# Patient Record
Sex: MALE | Race: BLACK OR AFRICAN AMERICAN | Employment: UNEMPLOYED | RURAL
[De-identification: names, ages, dates, MRNs, and addresses within clinical notes are randomized per-mention and may not be internally consistent; named-entity substitution may affect disease eponyms.]

---

## 2018-02-19 ENCOUNTER — OFFICE VISIT (OUTPATIENT)
Dept: INTERNAL MEDICINE CLINIC | Age: 28
End: 2018-02-19

## 2018-02-19 VITALS
BODY MASS INDEX: 35.31 KG/M2 | OXYGEN SATURATION: 99 % | DIASTOLIC BLOOD PRESSURE: 90 MMHG | WEIGHT: 225 LBS | SYSTOLIC BLOOD PRESSURE: 114 MMHG | RESPIRATION RATE: 16 BRPM | HEIGHT: 67 IN | TEMPERATURE: 98 F | HEART RATE: 88 BPM

## 2018-02-19 DIAGNOSIS — F32.0 MILD SINGLE CURRENT EPISODE OF MAJOR DEPRESSIVE DISORDER (HCC): ICD-10-CM

## 2018-02-19 DIAGNOSIS — G43.009 MIGRAINE WITHOUT AURA AND WITHOUT STATUS MIGRAINOSUS, NOT INTRACTABLE: ICD-10-CM

## 2018-02-19 DIAGNOSIS — J01.00 ACUTE MAXILLARY SINUSITIS, RECURRENCE NOT SPECIFIED: Primary | ICD-10-CM

## 2018-02-19 DIAGNOSIS — R53.83 FATIGUE, UNSPECIFIED TYPE: ICD-10-CM

## 2018-02-19 RX ORDER — AMOXICILLIN 500 MG/1
500 TABLET, FILM COATED ORAL 3 TIMES DAILY
Qty: 21 TAB | Refills: 0 | Status: SHIPPED | OUTPATIENT
Start: 2018-02-19 | End: 2018-02-26

## 2018-02-19 NOTE — PROGRESS NOTES
HISTORY OF PRESENT ILLNESS  Lilibeth Morse is a 28 y.o. male. Fatigue   The history is provided by the patient. Episode onset: several weeks. The problem occurs daily. Associated symptoms include headaches. Pertinent negatives include no abdominal pain. Exacerbated by: congestion. Treatments tried: tamiflu finished and flonase. The treatment provided mild relief. Started 1/10/2018, finished  Fatigue times weeks seems to started before getting the flu. Patient is new to this clinic. Comes in to establish care. Previous care was with . Reason for the change is: Insurance assigned. Vague historian especially with discussing mood issues, depression. Mom suggested he come in to evaluate headaches which are persistent but worse last few weeks. Pain tends to be bilateral accompanied by nausea. Slight cold symptoms no fever. 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      Social History Main Topics    Smoking status: Never Smoker    Smokeless tobacco: Never Used    Alcohol use No    Drug use: No    Sexual activity: Not Currently     Other Topics Concern    Not on file     Social History Narrative    Lives with mom and nephew         Review of Systems   Constitutional: Positive for fatigue and fever. HENT: Positive for congestion and sinus pain. Gastrointestinal: Positive for nausea. Negative for abdominal pain. Musculoskeletal: Negative for falls. Neurological: Positive for headaches. Negative for focal weakness. Psychiatric/Behavioral: Negative for suicidal ideas. I dont know       Physical Exam  Visit Vitals    /90 (BP 1 Location: Left arm, BP Patient Position: Sitting)    Pulse 88    Temp 98 °F (36.7 °C) (Oral)    Resp 16    Ht 5' 7\" (1.702 m)    Wt 225 lb (102.1 kg)    SpO2 99%    BMI 35.24 kg/m2     Well developed well nourished no acute distress. Pupils equal round react to light, extraocular muscles intact. Tympanic membranes within normal limits throat unremarkable  Cranial nerves II through XII are intact. Neck unremarkable  Heart regular rate and rhythm without clicks murmurs rubs  Lungs are clear to auscultation  Abdomen soft. Extremities, motor 5 out of 5 bilaterally, reflexes 2+ equal bilaterally. ASSESSMENT and PLAN    ICD-10-CM ICD-9-CM    1. Acute maxillary sinusitis, recurrence not specified J01.00 461.0    2. Mild single current episode of major depressive disorder (HCC) F32.0 296.21    3. Migraine without aura and without status migrainosus, not intractable G43.009 346.10    4. Fatigue, unspecified type R53.83 780.79        Orders Placed This Encounter    amoxicillin 500 mg tab     If antibiotics get rid of his headaches will assume its sinusitis but if they continue we will do further workup, consider migraines as an alternative cause. No longer on antidepressants unclear to me if he is depressed, discuss further upon follow-up. If the fatigue is not better will do more workup. Follow-up Disposition:  Return in about 3 weeks (around 3/12/2018) for routine follow up.

## 2018-02-19 NOTE — MR AVS SNAPSHOT
[address] [facility] 
[phone] Patient: Lady Matthews MRN: [id number] TIW:[birth year] Visit Information Date & Time Provider Department Dept. Phone Encounter #  
 2/19/2018  2:00 PM Dayana Julien MD [place] [id number] [phone] [id number] Follow-up Instructions Return in about 3 weeks (around 3/12/2018) for routine follow up. Upcoming Health Maintenance Date Due Pneumococcal 19-64 Highest Risk (2 of 3 - PCV13) 8/20/2014 DTaP/Tdap/Td series (2 - Td) 10/28/2024 Allergies as of 2/19/2018  Review Complete On: 2/19/2018 By: Dayana Julien MD  
 No Known Allergies Current Immunizations  Reviewed on 8/20/2013 Name Date Pneumococcal Polysaccharide (PPSV-23) 8/20/2013 TD Vaccine 1/14/2011  7:06 PM  
 Tdap 10/28/2014  6:03 PM  
  
 Not reviewed this visit You Were Diagnosed With   
  
 Codes Comments Acute maxillary sinusitis, recurrence not specified    -  Primary ICD-10-CM: J01.00 ICD-9-CM: 461.0 Mild single current episode of major depressive disorder (HCC)     ICD-10-CM: F32.0 ICD-9-CM: 296.21 Migraine without aura and without status migrainosus, not intractable     ICD-10-CM: B95.864 ICD-9-CM: 346.10 Vitals BP Pulse Temp Resp Height(growth percentile) Weight(growth percentile) 114/90 (BP 1 Location: Left arm, BP Patient Position: Sitting) 88 98 °F (36.7 °C) (Oral) 16 5' 7\" (1.702 m) 225 lb (102.1 kg) SpO2 BMI Smoking Status 99% 35.24 kg/m2 Never Smoker Vitals History BMI and BSA Data Body Mass Index Body Surface Area  
 35.24 kg/m 2 2.2 m 2 Preferred Pharmacy Pharmacy Name Phone [facility] [phone] [facility] [address] [phone] Your Updated Medication List  
  
   
This list is accurate as of: 2/19/18  2:49 PM.  Always use your most recent med list.  
  
  
  
 albuterol 90 mcg/actuation inhaler Commonly known as:  PROVENTIL HFA, VENTOLIN HFA, PROAIR HFA Take 2 Puffs by inhalation every four (4) hours as needed for Wheezing. amoxicillin 500 mg Tab Take 500 mg by mouth three (3) times daily for 7 days. fluticasone 50 mcg/actuation nasal spray Commonly known as:  Delmon Pickup 2 Sprays by Both Nostrils route daily. Prescriptions Sent to Pharmacy Refills  
 amoxicillin 500 mg tab 0 Sig: Take 500 mg by mouth three (3) times daily for 7 days. Class: Normal  
 Pharmacy: [place] [id number] #: [phone] Route: Oral  
  
Follow-up Instructions Return in about 3 weeks (around 3/12/2018) for routine follow up. Introducing [facility] & HEALTH SERVICES! Dear Immanuel Knee: 
Thank you for requesting a [employer] account. Our records indicate that you already have an active [employer] account. You can access your account anytime at [email or web address] Did you know that you can access your hospital and ER discharge instructions at any time in [employer]? You can also review all of your test results from your hospital stay or ER visit. Additional Information If you have questions, please visit the Frequently Asked Questions section of the [employer] website at [email or web address] Remember, [employer] is NOT to be used for urgent needs. For medical emergencies, dial 911. Now available from your iPhone and Android! Please provide this summary of care documentation to your next provider. Your primary care clinician is listed as Stephanie Aden. If you have any questions after today's visit, please call [phone].

## 2018-02-19 NOTE — PROGRESS NOTES
Chief Complaint   Patient presents with    Fatigue     sick sinse January, fatigued, pounding headaches, eye pain, nose running and running, throat sore, ears itching     I have reviewed the patient's medical history in detail and updated the computerized patient record. Health Maintenance reviewed. 1. Have you been to the ER, urgent care clinic since your last visit? Hospitalized since your last visit?no    2. Have you seen or consulted any other health care providers outside of the [facility] since your last visit? Include any pap smears or colon screening. No    Encouraged pt to discuss pt's wishes with spouse/partner/family and bring them in the next appt to follow thru with the Advanced Directive    Fall Risk Assessment, last 12 mths 2/19/2018   Able to walk? Yes   Fall in past 12 months? No       PHQ over the last two weeks 2/19/2018   Little interest or pleasure in doing things Several days   Feeling down, depressed or hopeless Several days   Total Score PHQ 2 2       Abuse Screening Questionnaire 2/19/2018   Do you ever feel afraid of your partner? N   Are you in a relationship with someone who physically or mentally threatens you? N   Is it safe for you to go home?  Y       ADL Assessment 2/19/2018   Feeding yourself No Help Needed   Getting from bed to chair No Help Needed   Getting dressed No Help Needed   Bathing or showering No Help Needed   Walk across the room (includes cane/walker) No Help Needed   Using the telphone No Help Needed   Taking your medications No Help Needed   Preparing meals No Help Needed   Managing money (expenses/bills) No Help Needed   Moderately strenuous housework (laundry) No Help Needed   Shopping for personal items (toiletries/medicines) No Help Needed   Shopping for groceries No Help Needed   Driving Help Needed   Climbing a flight of stairs No Help Needed   Getting to places beyond walking distances Help Needed

## 2018-06-19 ENCOUNTER — OFFICE VISIT (OUTPATIENT)
Dept: INTERNAL MEDICINE CLINIC | Age: 28
End: 2018-06-19

## 2018-06-19 VITALS
HEIGHT: 67 IN | RESPIRATION RATE: 18 BRPM | DIASTOLIC BLOOD PRESSURE: 97 MMHG | BODY MASS INDEX: 44.26 KG/M2 | SYSTOLIC BLOOD PRESSURE: 144 MMHG | OXYGEN SATURATION: 99 % | WEIGHT: 282 LBS | TEMPERATURE: 97.8 F | HEART RATE: 89 BPM

## 2018-06-19 DIAGNOSIS — Z13.1 SCREENING FOR DIABETES MELLITUS: ICD-10-CM

## 2018-06-19 DIAGNOSIS — Z11.3 SCREENING FOR STD (SEXUALLY TRANSMITTED DISEASE): ICD-10-CM

## 2018-06-19 DIAGNOSIS — Z13.220 SCREENING FOR CHOLESTEROL LEVEL: ICD-10-CM

## 2018-06-19 DIAGNOSIS — I10 ESSENTIAL HYPERTENSION: Primary | ICD-10-CM

## 2018-06-19 PROBLEM — E66.01 OBESITY, MORBID (HCC): Status: ACTIVE | Noted: 2018-06-19

## 2018-06-19 RX ORDER — LOSARTAN POTASSIUM AND HYDROCHLOROTHIAZIDE 12.5; 5 MG/1; MG/1
1 TABLET ORAL DAILY
Qty: 30 TAB | Refills: 1 | Status: SHIPPED | OUTPATIENT
Start: 2018-06-19 | End: 2018-07-17 | Stop reason: SDUPTHER

## 2018-06-19 NOTE — PATIENT INSTRUCTIONS
-Purchase a blood pressure cuff that goes around your upper arm and check blood pressure at least 3 times per week. Write down your numbers for review. Check blood pressure in the morning and evening. Rest for 5 minutes with feet elevated and arm resting on a table (at mid-chest level) when checking blood pressure    Goal is blood pressure is 130/80    -Exercise 30-60 minutes most days of the week, preferably with a mix of cardiovascular and strength training. Exercise can improve blood pressure, even if you do not lose weight!    -Limit alcohol intake to less than 2-3 drinks daily     -Avoid tobacco products    -Avoid caffeine, energy drinks, stimulants    -DASH diet - includes fruits, vegetables, fiber, and less than 2000 mg sodium (salt) daily. Try to eat less than 7166-5727 calories daily.  Limit fat intake.     -Avoid non-steroidal inflammatory medications (NSAIDS) such as ibuprofen, advil, motrin, aleve, and naproxyn as these may increase blood pressure if used long-term    -Avoid OTC decongestants such as pseudopherine, phenylephrine, Afrin    Start taking blood pressure medication daily return in one month for blood pressure check

## 2018-06-19 NOTE — PROGRESS NOTES
Reviewed record in preparation for visit and have obtained necessary documentation. Identified pt with two pt identifiers(name and ). Chief Complaint   Patient presents with   [address] Maintenance Due   Topic Date Due    Pneumococcal Vaccine (2 of 3 - PCV13) 2014       Mr. Shobha Jimenez has a reminder for a \"due or due soon\" health maintenance. I have asked that he discuss this further with his primary care provider for follow-up on this health maintenance. Coordination of Care Questionnaire:  :     1) Have you been to an emergency room, urgent care clinic since your last visit? no   Hospitalized since your last visit? no             2) Have you seen or consulted any other health care providers outside of [address] [place] [address] since your last visit? no  (Include any pap smears or colon screenings in this section.)    3) In the event something were to happen to you and you were unable to speak on your behalf, do you have an Advance Directive/ Living Will in place stating your wishes? NO    Do you have an Advance Directive on file? no    4) Are you interested in receiving information on Advance Directives? NO    Patient is accompanied by self I have received verbal consent from Radha Dias to discuss any/all medical information while they are present in the room.

## 2018-06-19 NOTE — PROGRESS NOTES
Mr. Lizbet Ascencio is a new patient who is here to establish care. CC:  Establish Care       HPI:    Patient is presenting to establish care     Hx of severe acid reflux - patient had Nissen procedure about 10 years ago and worked. Patient denies abdominal pain and vomiting. Patient denies sour taste in mouth    Patient has been seeing dentist and told \" consistently mildly elevated blood pressure\"   Blood pressure today is 144/97  Denies CP and shortness of breath   Has gained weight in the past 2 years   Mother has hx of HTN       Patient is concerned he may have diabetes also, at times feels increased thirst and urinatinon  Mother has a hx of diabetes    Patient is planning to go back to gym next month.  Studying for [employer]   Not sexually active currently but would like to be tested for STDs     Review of systems:  Constitutional: negative for fever, chills, weight loss, night sweats   Eyes : negative for vision changes, eye pain and discharge  Nose and Throat: negative for tinnitus, sore throat   Cardiovascular: negative for chest pain, palpitations and shortness of breath  Respiratory: negative for shortness of breath, cough and wheezing   Gastroinstestinal: negative for abdominal pain, nausea, vomiting, diarrhea, constipation, and blood in the stool  Musculoskeletal: negative for back ache and joint ache   Genitourinary: negative for dysuria, nocturia, polyuria and hematuria   Neurologic: Negative for focal weakness, numbness or incoordination  Skin: negative for rash, pruritus  Hematologic: negative for easy bruising      Past Medical History:   Diagnosis Date    Asthma     Asthma 7/22/2013    Gastrointestinal disorder     GERD (gastroesophageal reflux disease)     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 1 Inhaler 5    fluticasone (FLONASE) 50 mcg/actuation nasal spray 2 Sprays by Both Nostrils route daily. 1 Bottle 5     No current facility-administered medications on file prior to visit. family history includes Asthma in his mother; Diabetes in his mother; Hypertension in his mother. Social History     Social History    Marital status: SINGLE     Spouse name: N/A    Number of children: N/A    Years of education: N/A     Occupational History    Not on file. Social History Main Topics    Smoking status: Never Smoker    Smokeless tobacco: Never Used    Alcohol use Yes    Drug use: No    Sexual activity: Not Currently     Other Topics Concern    Not on file     Social History Narrative    Lives with mom and nephew       Visit Vitals    BP (!) 144/97 (BP 1 Location: Right arm, BP Patient Position: Sitting)    Pulse 89    Temp 97.8 °F (36.6 °C) (Oral)    Resp 18    Ht 5' 7\" (1.702 m)    Wt 282 lb (127.9 kg)    SpO2 99%    BMI 44.17 kg/m2     General:  Well appearing male no acute distress  HEENT:   PERRL,normal conjunctiva. External ear and canals normal, TMs normal.  Hearing normal to voice. Nose without edema or discharge, normal septum. Lips, teeth, gums normal.  Oropharynx: no erythema, no exudates, no lesions, normal tongue. Neck:  Supple. Thyroid normal size, nontender, without nodules. No carotid bruit. No masses or lymphadenopathy  Respiratory: no respiratory distress,  no wheezing, no rhonchi, no rales. No chest wall tenderness. Cardiovascular:  RRR, normal S1S2, no murmur.     Gastrointestinal: normal bowel sounds, soft, nontender, without masses. No hepatosplenomegaly. Extremities +2 pulses, no edema, normal sensation   Musculoskeletal:  Normal gait. Normal digits and nails. Normal strength and tone, no atrophy, and no abnormal movement. Skin:  No rash, no lesions, no ulcers. Skin warm, normal turgor, without induration or nodules. Neuro:  A and OX4, fluent speech, cranial nerves normal 2-12. Sensation normal to light touch. DTR symmetrical  Psych:  Normal affect      Lab Results   Component Value Date/Time    WBC 4.7 01/08/2014 11:35 AM    Hemoglobin (POC) 16.7 01/08/2014 11:41 AM    HGB 15.3 01/08/2014 11:35 AM    Hematocrit (POC) 49 01/08/2014 11:41 AM    HCT 48.1 01/08/2014 11:35 AM    PLATELET 650 07/78/4659 11:35 AM    MCV 71.6 (L) 01/08/2014 11:35 AM     Lab Results   Component Value Date/Time    Sodium 140 08/20/2013 02:18 PM    Potassium 4.5 08/20/2013 02:18 PM    Chloride 101 08/20/2013 02:18 PM    CO2 26 08/20/2013 02:18 PM    Anion gap 9 02/23/2013 06:55 AM    Glucose 74 08/20/2013 02:18 PM    Glucose 95 02/24/2013 06:45 AM    BUN 11 08/20/2013 02:18 PM    Creatinine 0.94 08/20/2013 02:18 PM    BUN/Creatinine ratio 12 08/20/2013 02:18 PM    GFR est AA >60 02/23/2013 06:55 AM    GFR est non- 08/20/2013 02:18 PM    Calcium 9.5 08/20/2013 02:18 PM     Lab Results   Component Value Date/Time    Cholesterol, total 143 02/24/2013 06:45 AM    HDL Cholesterol 43 02/24/2013 06:45 AM    LDL, calculated 84.2 02/24/2013 06:45 AM    VLDL, calculated 15.8 02/24/2013 06:45 AM    Triglyceride 79 02/24/2013 06:45 AM    CHOL/HDL Ratio 3.3 02/24/2013 06:45 AM     Lab Results   Component Value Date/Time    TSH 0.87 02/24/2013 06:45 AM     No results found for: HBA1C, HGBE8, NPW4JDVJ, RIC0YOIP, YKX2VBDV  No results found for: Camronadalgisa Mueller, XQVID3, XQVID, VD3RIA                Assessment and Plan:   27 yo woman presenting to [facility] care and noted to have HTN     1.  Essential hypertension  New diagnosis, stage II HTN. Patient has family hx of HTN and is obese. recommend checking blood pressure with goal of less than  130/85 on average. Follow lo sodium diet. Given DASH diet guidelines. Recommend cardiovascular exercise regularly. Work on weight reduction. Call with blood pressure readings.   - METABOLIC PANEL, COMPREHENSIVE  - CBC WITH AUTOMATED DIFF  - URINALYSIS W/ RFLX MICROSCOPIC  - losartan-hydroCHLOROthiazide (HYZAAR) 50-12.5 mg per tablet; Take 1 Tab by mouth daily. Dispense: 30 Tab; Refill: 1    2. Screening for diabetes mellitus  - HEMOGLOBIN A1C WITH EAG    3. Screening for cholesterol level  - LIPID PANEL    4. Screening for STD (sexually transmitted disease)  - HIV 1/2 AG/AB, 4TH GENERATION,W RFLX CONFIRM  - RPR    5. GERD: patient is s/p nissen procedure in 2008 - denied active GERD symptoms    We discuss need to loose weight and exercise     Follow-up Disposition:  Return in about 4 weeks (around 7/17/2018) for blood pressure .      Luci Mckeon MD

## 2018-06-19 NOTE — MR AVS SNAPSHOT
[address] [place] [address] 
[phone] Patient: Andrew Bose MRN: [id number] TRD:0/54/6114 Visit Information Date & Time Provider Department Dept. Phone Encounter #  
 6/19/2018  9:30 AM RoxannwestonBrayden [address] [place] [address],[address] [phone] [id number] Follow-up Instructions Return in about 4 weeks (around 7/17/2018) for blood pressure . Upcoming Health Maintenance Date Due Pneumococcal 19-64 Highest Risk (2 of 3 - PCV13) 8/20/2014 Influenza Age 5 to Adult 8/1/2018 DTaP/Tdap/Td series (2 - Td) 10/28/2024 Allergies as of 6/19/2018  Review Complete On: 6/19/2018 By: Liseth Blanchard No Known Allergies Current Immunizations  Reviewed on 8/20/2013 Name Date Pneumococcal Polysaccharide (PPSV-23) 8/20/2013 TD Vaccine 1/14/2011  7:06 PM  
 Tdap 10/28/2014  6:03 PM  
  
 Not reviewed this visit You Were Diagnosed With   
  
 Codes Comments Essential hypertension    -  Primary ICD-10-CM: I10 
ICD-9-CM: 401.9 Screening for diabetes mellitus     ICD-10-CM: Z13.1 ICD-9-CM: V77.1 Screening for cholesterol level     ICD-10-CM: Z13.220 ICD-9-CM: V77.91 Screening for STD (sexually transmitted disease)     ICD-10-CM: Z11.3 ICD-9-CM: V74.5 Vitals BP Pulse Temp Resp Height(growth percentile) Weight(growth percentile) (!) 144/97 (BP 1 Location: Right arm, BP Patient Position: Sitting) 89 97.8 °F (36.6 °C) (Oral) 18 5' 7\" (1.702 m) 282 lb (127.9 kg) SpO2 BMI Smoking Status 99% 44.17 kg/m2 Never Smoker BMI and BSA Data Body Mass Index Body Surface Area  
 44.17 kg/m 2 2.46 m 2 [facility] [facility] [address] [phone] Your Updated Medication List  
  
   
This list is accurate as of 6/19/18 10:14 AM.  Always use your most recent med list.  
  
  
  
  
 albuterol 90 mcg/actuation inhaler Commonly known as:  PROVENTIL HFA, VENTOLIN HFA, PROAIR HFA Take 2 Puffs by inhalation every four (4) hours as needed for Wheezing. fluticasone 50 mcg/actuation nasal spray Commonly known as:  Lovetta End 2 Sprays by Both Nostrils route daily. losartan-hydroCHLOROthiazide 50-12.5 mg per tablet Commonly known as:  HYZAAR Take 1 Tab by mouth daily. Prescriptions Sent to Pharmacy Refills  
 losartan-hydroCHLOROthiazide (HYZAAR) 50-12.5 mg per tablet 1 Sig: Take 1 Tab by mouth daily. Class: Normal  
 Pharmacy: [facility] [id number] #: [phone] Route: Oral  
  
We Performed the Following CBC WITH AUTOMATED DIFF [43668 CPT(R)] HEMOGLOBIN A1C WITH EAG [69843 CPT(R)] HIV 1/2 AG/AB, 4TH GENERATION,W RFLX CONFIRM C1870317 CPT(R)] LIPID PANEL [06924 CPT(R)] METABOLIC PANEL, COMPREHENSIVE [52496 CPT(R)] RPR [93465 CPT(R)] URINALYSIS W/ RFLX MICROSCOPIC [55489 CPT(R)] Follow-up Instructions Return in about 4 weeks (around 7/17/2018) for blood pressure . Patient Instructions   
-Purchase a blood pressure cuff that goes around your upper arm and check blood pressure at least 3 times per week. Write down your numbers for review. Check blood pressure in the morning and evening. Rest for 5 minutes with feet elevated and arm resting on a table (at mid-chest level) when checking blood pressure Goal is blood pressure is 130/80 
 
-Exercise 30-60 minutes most days of the week, preferably with a mix of cardiovascular and strength training. Exercise can improve blood pressure, even if you do not lose weight! 
 
-Limit alcohol intake to less than 2-3 drinks daily -Avoid tobacco products -Avoid caffeine, energy drinks, stimulants -DASH diet - includes fruits, vegetables, fiber, and less than 2000 mg sodium (salt) daily. Try to eat less than 8994-7299 calories daily. Limit fat intake.  
 
-Avoid non-steroidal inflammatory medications (NSAIDS) such as ibuprofen, advil, motrin, aleve, and naproxyn as these may increase blood pressure if used long-term 
 
-Avoid OTC decongestants such as pseudopherine, phenylephrine, Afrin Start taking blood pressure medication daily return in one month for blood pressure check Introducing [facility] & HEALTH SERVICES! Dear Nuha Mi: 
Thank you for requesting a [employer] account. Our records indicate that you already have an active [employer] account. You can access your account anytime at [email or web address] Did you know that you can access your hospital and ER discharge instructions at any time in [employer] You can also review all of your test results from your hospital stay or ER visit. Additional Information If you have questions, please visit the Frequently Asked Questions section of the [employer] website at [email or web address] Remember, [employer] is NOT to be used for urgent needs. For medical emergencies, dial 911. Now available from your iPhone and Android! Please provide this summary of care documentation to your next provider. Your primary care clinician is listed as KEVIN Ma. If you have any questions after today's visit, please call [phone].

## 2018-06-20 LAB
ALBUMIN SERPL-MCNC: 4.3 G/DL (ref 3.5–5.5)
ALBUMIN/GLOB SERPL: 1.8 {RATIO} (ref 1.2–2.2)
ALP SERPL-CCNC: 79 IU/L (ref 39–117)
ALT SERPL-CCNC: 21 IU/L (ref 0–44)
APPEARANCE UR: CLEAR
AST SERPL-CCNC: 23 IU/L (ref 0–40)
BASOPHILS # BLD AUTO: 0 X10E3/UL (ref 0–0.2)
BASOPHILS NFR BLD AUTO: 0 %
BILIRUB SERPL-MCNC: 0.3 MG/DL (ref 0–1.2)
BILIRUB UR QL STRIP: NEGATIVE
BUN SERPL-MCNC: 7 MG/DL (ref 6–20)
BUN/CREAT SERPL: 6 (ref 9–20)
CALCIUM SERPL-MCNC: 9.1 MG/DL (ref 8.7–10.2)
CHLORIDE SERPL-SCNC: 102 MMOL/L (ref 96–106)
CHOLEST SERPL-MCNC: 184 MG/DL (ref 100–199)
CO2 SERPL-SCNC: 22 MMOL/L (ref 20–29)
COLOR UR: YELLOW
CREAT SERPL-MCNC: 1.2 MG/DL (ref 0.76–1.27)
EOSINOPHIL # BLD AUTO: 0.1 X10E3/UL (ref 0–0.4)
EOSINOPHIL NFR BLD AUTO: 1 %
ERYTHROCYTE [DISTWIDTH] IN BLOOD BY AUTOMATED COUNT: 15.3 % (ref 12.3–15.4)
EST. AVERAGE GLUCOSE BLD GHB EST-MCNC: 114 MG/DL
GFR SERPLBLD CREATININE-BSD FMLA CKD-EPI: 82 ML/MIN/1.73
GFR SERPLBLD CREATININE-BSD FMLA CKD-EPI: 95 ML/MIN/1.73
GLOBULIN SER CALC-MCNC: 2.4 G/DL (ref 1.5–4.5)
GLUCOSE SERPL-MCNC: 88 MG/DL (ref 65–99)
GLUCOSE UR QL: NEGATIVE
HBA1C MFR BLD: 5.6 % (ref 4.8–5.6)
HCT VFR BLD AUTO: 46.1 % (ref 37.5–51)
HDLC SERPL-MCNC: 44 MG/DL
HGB BLD-MCNC: 14.8 G/DL (ref 13–17.7)
HGB UR QL STRIP: NEGATIVE
HIV 1+2 AB+HIV1 P24 AG SERPL QL IA: NON REACTIVE
IMM GRANULOCYTES # BLD: 0 X10E3/UL (ref 0–0.1)
IMM GRANULOCYTES NFR BLD: 0 %
KETONES UR QL STRIP: NEGATIVE
LDLC SERPL CALC-MCNC: 125 MG/DL (ref 0–99)
LEUKOCYTE ESTERASE UR QL STRIP: NEGATIVE
LYMPHOCYTES # BLD AUTO: 2.6 X10E3/UL (ref 0.7–3.1)
LYMPHOCYTES NFR BLD AUTO: 41 %
MCH RBC QN AUTO: 22.5 PG (ref 26.6–33)
MCHC RBC AUTO-ENTMCNC: 32.1 G/DL (ref 31.5–35.7)
MCV RBC AUTO: 70 FL (ref 79–97)
MICRO URNS: NORMAL
MONOCYTES # BLD AUTO: 0.5 X10E3/UL (ref 0.1–0.9)
MONOCYTES NFR BLD AUTO: 7 %
NEUTROPHILS # BLD AUTO: 3.3 X10E3/UL (ref 1.4–7)
NEUTROPHILS NFR BLD AUTO: 51 %
NITRITE UR QL STRIP: NEGATIVE
PH UR STRIP: 6 [PH] (ref 5–7.5)
PLATELET # BLD AUTO: 227 X10E3/UL (ref 150–379)
POTASSIUM SERPL-SCNC: 4.1 MMOL/L (ref 3.5–5.2)
PROT SERPL-MCNC: 6.7 G/DL (ref 6–8.5)
PROT UR QL STRIP: NEGATIVE
RBC # BLD AUTO: 6.59 X10E6/UL (ref 4.14–5.8)
RPR SER QL: NON REACTIVE
SODIUM SERPL-SCNC: 142 MMOL/L (ref 134–144)
SP GR UR: 1.02 (ref 1–1.03)
TRIGL SERPL-MCNC: 77 MG/DL (ref 0–149)
UROBILINOGEN UR STRIP-MCNC: 1 MG/DL (ref 0.2–1)
VLDLC SERPL CALC-MCNC: 15 MG/DL (ref 5–40)
WBC # BLD AUTO: 6.5 X10E3/UL (ref 3.4–10.8)

## 2018-06-20 NOTE — PROGRESS NOTES
Normal kidney and liver function   No diabetes  STD testing is negative  Cholesterol: Triglycerides are normal ( short term fat storage), HDL good cholesterol is at goal,  LDL which is bad cholesterol is mildly elevated. Recommend increasing  exercise to 30 minutes daily and increased fiber intake - vegetables, fruits and oats and whole grain. Decreasing fatty food intake. We will repeat cholesterol level in one year.

## 2018-07-17 ENCOUNTER — OFFICE VISIT (OUTPATIENT)
Dept: INTERNAL MEDICINE CLINIC | Age: 28
End: 2018-07-17

## 2018-07-17 VITALS
OXYGEN SATURATION: 99 % | WEIGHT: 283 LBS | RESPIRATION RATE: 18 BRPM | DIASTOLIC BLOOD PRESSURE: 86 MMHG | HEIGHT: 67 IN | SYSTOLIC BLOOD PRESSURE: 121 MMHG | HEART RATE: 94 BPM | TEMPERATURE: 97.8 F | BODY MASS INDEX: 44.42 KG/M2

## 2018-07-17 DIAGNOSIS — I10 ESSENTIAL HYPERTENSION: ICD-10-CM

## 2018-07-17 DIAGNOSIS — I10 ESSENTIAL HYPERTENSION: Primary | ICD-10-CM

## 2018-07-17 DIAGNOSIS — R53.82 CHRONIC FATIGUE: ICD-10-CM

## 2018-07-17 RX ORDER — LOSARTAN POTASSIUM AND HYDROCHLOROTHIAZIDE 12.5; 5 MG/1; MG/1
1 TABLET ORAL DAILY
Qty: 30 TAB | Refills: 1 | Status: SHIPPED | OUTPATIENT
Start: 2018-07-17 | End: 2018-10-21 | Stop reason: SDUPTHER

## 2018-07-17 NOTE — MR AVS SNAPSHOT
[address] 
[phone] Patient: Dakota Vasquez MRN: [id number] RBD:[id number] Visit Information Date & Time Provider Department Dept. Phone Encounter #  
 7/17/2018  9:30 AM Myrt[place], [address] [place] [address],[address] [phone] [id number] Follow-up Instructions Return in about 3 months (around 10/17/2018) for HTN. Upcoming Health Maintenance Date Due Pneumococcal 19-64 Highest Risk (2 of 3 - PCV13) 8/20/2014 Influenza Age 5 to Adult 8/1/2018 DTaP/Tdap/Td series (2 - Td) 10/28/2024 Allergies as of 7/17/2018  Review Complete On: 6/19/2018 By: MD Myrtle  
 No Known Allergies Current Immunizations  Reviewed on 8/20/2013 Name Date Pneumococcal Polysaccharide (PPSV-23) 8/20/2013 TD Vaccine 1/14/2011  7:06 PM  
 Tdap 10/28/2014  6:03 PM  
  
 Not reviewed this visit You Were Diagnosed With   
  
 Codes Comments Essential hypertension    -  Primary ICD-10-CM: I10 
ICD-9-CM: 401.9 Chronic fatigue     ICD-10-CM: R53.82 
ICD-9-CM: 780.79 Vitals BP Pulse Temp Resp Height(growth percentile) Weight(growth percentile) 121/86 (BP 1 Location: Right arm, BP Patient Position: Sitting) 94 97.8 °F (36.6 °C) (Oral) 18 5' 7\" (1.702 m) 283 lb (128.4 kg) SpO2 BMI Smoking Status 99% 44.32 kg/m2 Never Smoker Vitals History BMI and BSA Data Body Mass Index Body Surface Area 44.32 kg/m 2 2.46 m 2 Preferred Pharmacy Pharmacy Name Phone [facility] [phone] [place] [address] [phone] Your Updated Medication List  
  
   
This list is accurate as of 7/17/18  9:42 AM.  Always use your most recent med list.  
  
  
  
  
 albuterol 90 mcg/actuation inhaler Commonly known as:  PROVENTIL HFA, VENTOLIN HFA, PROAIR HFA  
 Take 2 Puffs by inhalation every four (4) hours as needed for Wheezing. fluticasone 50 mcg/actuation nasal spray Commonly known as:  Gerard Cyphers 2 Sprays by Both Nostrils route daily. losartan-hydroCHLOROthiazide 50-12.5 mg per tablet Commonly known as:  HYZAAR Take 1 Tab by mouth daily. We Performed the Following METABOLIC PANEL, BASIC [93099 CPT(R)] TSH AND FREE T4 [16067 CPT(R)] Follow-up Instructions Return in about 3 months (around 10/17/2018) for HTN. Patient Instructions If headaches persist we will change blood pressure medication We are checking thyroid and kidney function today Introducing [facility] SERVICES! Dear Haja Carbajal: 
Thank you for requesting a [employer] account. Our records indicate that you already have an active [employer] account. You can access your account anytime at [email or web address]. [email or web address] Did you know that you can access your hospital and ER discharge instructions at any time in [employer]? You can also review all of your test results from your hospital stay or ER visit. Additional Information If you have questions, please visit the Frequently Asked Questions section of the [employer] website at [email or web address] Remember, [employer] is NOT to be used for urgent needs. For medical emergencies, dial 911. Now available from your iPhone and Android! Please provide this summary of care documentation to your next provider. Your primary care clinician is listed as KEVIN Ma. If you have any questions after today's visit, please call [phone].

## 2018-07-17 NOTE — PATIENT INSTRUCTIONS
If headaches persist we will change blood pressure medication  We are checking thyroid and kidney function today

## 2018-07-17 NOTE — PROGRESS NOTES
Reviewed record in preparation for visit and have obtained necessary documentation. Identified pt with two pt identifiers(name and ). Chief Complaint   Patient presents with    Hypertension       Health Maintenance Due   Topic Date Due    Pneumococcal Vaccine (2 of 3 - PCV13) 2014       Mr. Benita Stevens has a reminder for a \"due or due soon\" health maintenance. I have asked that he discuss this further with his primary care provider for follow-up on this health maintenance. Coordination of Care Questionnaire:  :     1) Have you been to an emergency room, urgent care clinic since your last visit? no   Hospitalized since your last visit? no             2) Have you seen or consulted any other health care providers outside of [address] [place] [address] since your last visit? no  (Include any pap smears or colon screenings in this section.)    3) In the event something were to happen to you and you were unable to speak on your behalf, do you have an Advance Directive/ Living Will in place stating your wishes? NO    Do you have an Advance Directive on file? no    4) Are you interested in receiving information on Advance Directives? NO    Patient is accompanied by self I have received verbal consent from Minerva Mejia to discuss any/all medical information while they are present in the room.

## 2018-07-18 LAB
BUN SERPL-MCNC: 13 MG/DL (ref 6–20)
BUN/CREAT SERPL: 11 (ref 9–20)
CALCIUM SERPL-MCNC: 9.5 MG/DL (ref 8.7–10.2)
CHLORIDE SERPL-SCNC: 96 MMOL/L (ref 96–106)
CO2 SERPL-SCNC: 25 MMOL/L (ref 20–29)
CREAT SERPL-MCNC: 1.16 MG/DL (ref 0.76–1.27)
GLUCOSE SERPL-MCNC: 89 MG/DL (ref 65–99)
POTASSIUM SERPL-SCNC: 3.7 MMOL/L (ref 3.5–5.2)
SODIUM SERPL-SCNC: 140 MMOL/L (ref 134–144)
T4 FREE SERPL-MCNC: 1.61 NG/DL (ref 0.82–1.77)
TSH SERPL DL<=0.005 MIU/L-ACNC: 3.35 UIU/ML (ref 0.45–4.5)

## 2018-10-18 ENCOUNTER — OFFICE VISIT (OUTPATIENT)
Dept: INTERNAL MEDICINE CLINIC | Age: 28
End: 2018-10-18

## 2018-10-18 VITALS
RESPIRATION RATE: 16 BRPM | TEMPERATURE: 97.7 F | HEIGHT: 67 IN | HEART RATE: 76 BPM | DIASTOLIC BLOOD PRESSURE: 82 MMHG | WEIGHT: 268 LBS | OXYGEN SATURATION: 98 % | SYSTOLIC BLOOD PRESSURE: 117 MMHG | BODY MASS INDEX: 42.06 KG/M2

## 2018-10-18 DIAGNOSIS — Z23 ENCOUNTER FOR IMMUNIZATION: ICD-10-CM

## 2018-10-18 DIAGNOSIS — R53.82 CHRONIC FATIGUE: ICD-10-CM

## 2018-10-18 DIAGNOSIS — E66.01 OBESITY, MORBID (HCC): ICD-10-CM

## 2018-10-18 DIAGNOSIS — I10 ESSENTIAL HYPERTENSION: Primary | ICD-10-CM

## 2018-10-18 DIAGNOSIS — R06.83 SNORING: ICD-10-CM

## 2018-10-18 DIAGNOSIS — R40.0 DAYTIME SLEEPINESS: ICD-10-CM

## 2018-10-18 NOTE — PROGRESS NOTES
CC: Follow-up      HPI:    He is a 33 y.o. male who presents for evaluation of     He was las seen on 07/17    HTN: patient is on losartan 50mg with HCTZ 12.5mg daily. Denies chest pain and shortness of breath. Patient snores, feels sleepy in the car. Does not feel rested after sleeping. Patient lost 12 lbs since last visit. Patient is on celexa and Seroquel,recnetly Celexa does increased - had a difficulty last month due to personal situations. Denies SI   Sees mental health NP Kasandra Brittle  Follow-up Disposition:  Return in about 3 months (around 10/17/2018) for HTN.      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  QUEtiapine (SEROQUEL) 25 mg tablet Take 25 mg by mouth nightly.  losartan-hydroCHLOROthiazide (HYZAAR) 50-12.5 mg per tablet Take 1 Tab by mouth daily. 30 Tab 1     No current facility-administered medications on file prior to visit.         Past Surgical History:   Procedure Laterality Date    ABDOMEN SURGERY PROC UNLISTED  2005    nissen    HX GI      gerd    HX ORTHOPAEDIC      bilateral feet    HX TONSIL AND ADENOIDECTOMY      HX UROLOGICAL      stone  removal       Family History   Problem Relation Age of Onset    Asthma Mother     Hypertension Mother     Diabetes Mother      Reviewed and no changes     Social History     Socioeconomic History    Marital status: SINGLE     Spouse name: Not on file    Number of children: Not on file    Years of education: Not on file    Highest education level: Not on file   Social Needs    Financial resource strain: Not on file    Food insecurity - worry: Not on file    Food insecurity - inability: Not on file    Transportation needs - medical: Not on file   Ky[place]us needs - non-medical: Not on file   Occupational History    Not on file   Tobacco Use    Smoking status: Never Smoker    Smokeless tobacco: Never Used   Substance and Sexual Activity    Alcohol use:  Yes    Drug use: No    Sexual activity: Not Currently   Other Topics Concern    Not on file   Social History Narrative    Lives with mom and nephew            Visit Vitals  /82 (BP 1 Location: Right arm, BP Patient Position: Sitting)   Pulse 76   Temp 97.7 °F (36.5 °C) (Oral)   Resp 16   Ht 5' 7\" (1.702 m)   Wt 268 lb (121.6 kg)   SpO2 98%   BMI 41.97 kg/m²       Physical Examination:   General - Well appearing male  HEENT - PERRL, TM no erythema/opacification, normal nasal turbinates, oropharynx no erythema or exudate, MMM  Neck - supple, no bruits, no TMG, no LAD  Pulm - clear to auscultation bilaterally  Cardio - RRR, normal S1 S2, no murmur gallops or rubs  Abd - soft, nontender, no masses, no HSM  Extrem - no edema, +2 distal pulses  Psych - normal affect, appropriate mood    Lab Results   Component Value Date/Time    WBC 6.4 09/10/2018 02:00 PM    Hemoglobin (POC) 16.7 01/08/2014 11:41 AM    HGB 14.3 09/10/2018 02:00 PM    Hematocrit (POC) 49 01/08/2014 11:41 AM    HCT 46.6 09/10/2018 02:00 PM    PLATELET 422 95/25/0488 02:00 PM    MCV 72.4 (L) 09/10/2018 02:00 PM     Lab Results   Component Value Date/Time    Sodium 137 09/10/2018 02:00 PM    Potassium 3.5 09/10/2018 02:00 PM    Chloride 101 09/10/2018 02:00 PM    CO2 31 09/10/2018 02:00 PM    Anion gap 5 09/10/2018 02:00 PM    Glucose 88 09/10/2018 02:00 PM    Glucose 95 02/24/2013 06:45 AM    BUN 12 09/10/2018 02:00 PM    Creatinine 1.35 (H) 09/10/2018 02:00 PM    BUN/Creatinine ratio 9 (L) 09/10/2018 02:00 PM    GFR est AA >60 09/10/2018 02:00 PM    GFR est non-AA >60 09/10/2018 02:00 PM    Calcium 9.0 09/10/2018 02:00 PM     Lab Results   Component Value Date/Time    Cholesterol, total 184 06/19/2018 10:22 AM    HDL Cholesterol 44 06/19/2018 10:22 AM    LDL, calculated 125 (H) 06/19/2018 10:22 AM    VLDL, calculated 15 06/19/2018 10:22 AM    Triglyceride 77 06/19/2018 10:22 AM    CHOL/HDL Ratio 3.3 02/24/2013 06:45 AM     Lab Results   Component Value Date/Time    TSH 3.350 07/17/2018 10:07 AM     No results found for: PSA, Nevelyn Kras, [id number], [id number], PSALT  Lab Results   Component Value Date/Time    Hemoglobin A1c 5.6 06/19/2018 10:22 AM     No results found for: [place] VD3RIA    Lab Results   Component Value Date/Time    ALT (SGPT) 39 09/10/2018 02:00 PM    AST (SGOT) 30 09/10/2018 02:00 PM    Alk. phosphatase 82 09/10/2018 02:00 PM    Bilirubin, direct <0.1 01/08/2014 11:35 AM    Bilirubin, total 0.7 09/10/2018 02:00 PM           Assessment/Plan:    1. Encounter for immunization  - INFLUENZA VIRUS VAC QUAD,SPLIT,PRESV FREE SYRINGE IM    2. Obesity, morbid ([address]  - lost 12 lbs, congratulated patient, counseled on healthy diet    3. Essential hypertension  - blood pressure is well controlled on current regimen  - continue losartan 50mg and HCTZ 12.5mg     4. Daytime sleepiness    5. Snoring    6. Chronic fatigue  Suspect MING and ordered sleep study    7.  Depression: stable on seroquel and celexa   Sees mental health NP Jose Carlos Barragan  Follow-up Disposition: Not on Elvis Dewitt MD

## 2018-10-18 NOTE — PATIENT INSTRUCTIONS
Continue same dose of blood pressure medication    We are ordering a sleep study to be done at home to evaluate for possible sleep apnea which is a common cause of fatigue snoring and not having rest full sleep.    Obesity and High blood pressure are big risk factors

## 2018-10-21 DIAGNOSIS — I10 ESSENTIAL HYPERTENSION: ICD-10-CM

## 2018-10-22 RX ORDER — LOSARTAN POTASSIUM AND HYDROCHLOROTHIAZIDE 12.5; 5 MG/1; MG/1
TABLET ORAL
Qty: 30 TAB | Refills: 1 | Status: SHIPPED | OUTPATIENT
Start: 2018-10-22 | End: 2018-12-23 | Stop reason: SDUPTHER

## 2018-12-23 DIAGNOSIS — I10 ESSENTIAL HYPERTENSION: ICD-10-CM

## 2018-12-24 RX ORDER — LOSARTAN POTASSIUM AND HYDROCHLOROTHIAZIDE 12.5; 5 MG/1; MG/1
TABLET ORAL
Qty: 30 TAB | Refills: 1 | Status: SHIPPED | OUTPATIENT
Start: 2018-12-24 | End: 2019-03-12 | Stop reason: SDUPTHER

## 2019-03-12 DIAGNOSIS — I10 ESSENTIAL HYPERTENSION: ICD-10-CM

## 2019-03-12 RX ORDER — LOSARTAN POTASSIUM AND HYDROCHLOROTHIAZIDE 12.5; 5 MG/1; MG/1
TABLET ORAL
Qty: 30 TAB | Refills: 1 | Status: SHIPPED | OUTPATIENT
Start: 2019-03-12 | End: 2019-05-06 | Stop reason: SDUPTHER

## 2019-05-06 DIAGNOSIS — I10 ESSENTIAL HYPERTENSION: ICD-10-CM

## 2019-05-06 RX ORDER — LOSARTAN POTASSIUM AND HYDROCHLOROTHIAZIDE 12.5; 5 MG/1; MG/1
TABLET ORAL
Qty: 30 TAB | Refills: 1 | Status: SHIPPED | OUTPATIENT
Start: 2019-05-06 | End: 2020-08-18

## 2020-08-16 ENCOUNTER — HOSPITAL ENCOUNTER (OUTPATIENT)
Dept: LAB | Age: 30
Discharge: HOME | End: 2020-08-16
Payer: MEDICAID

## 2020-08-16 DIAGNOSIS — U07.1 COVID-19: ICD-10-CM

## 2020-08-16 PROCEDURE — 87635 SARS-COV-2 COVID-19 AMP PRB: CPT

## 2020-08-17 LAB — SARS-COV-2, COV2NT: NOT DETECTED

## 2020-08-18 RX ORDER — ALBUTEROL SULFATE 90 UG/1
1 AEROSOL, METERED RESPIRATORY (INHALATION)
COMMUNITY

## 2020-08-18 RX ORDER — DOXEPIN HYDROCHLORIDE 10 MG/1
10 CAPSULE ORAL
COMMUNITY

## 2020-08-18 RX ORDER — EMTRICITABINE AND TENOFOVIR DISOPROXIL FUMARATE 200; 300 MG/1; MG/1
1 TABLET, FILM COATED ORAL DAILY
COMMUNITY
End: 2022-02-21 | Stop reason: ALTCHOICE

## 2020-08-18 RX ORDER — VENLAFAXINE 75 MG/1
75 TABLET ORAL DAILY
COMMUNITY

## 2020-08-18 NOTE — PERIOP NOTES
[address]  Endoscopy Preprocedure Instructions      1. On the day of your surgery, please report to registration located on the 2nd floor of the  [facility]. yes    2. You must have a responsible adult to drive you to the hospital, stay at the hospital during your procedure and drive you home. If they leave your procedure will not be started (no exceptions). yes    3. Do not have anything to eat or drink (including water, gum, mints, coffee, and juice) after midnight. This does not apply to the medications you were instructed to take by your physician. yesIf you are currently taking Plavix, Coumadin, Aspirin, or other blood-thinning agents, contact your physician for special instructions. not applicable,    4. If you are having a procedure that requires bowel prep: We recommend that you have only clear liquids the day before your procedure and begin your bowel prep by 5:00 pm.  You may continue to drink clear liquids until midnight. If for any reason you are not able to complete your prep please notify your physician immediately. not applicable    5. Have a list of all current medications, including vitamins, herbal supplements and any other over the counter medications. yes    6. If you wear glasses, contacts, dentures and/or hearing aids, they may be removed prior to procedure, please bring a case to store them in. yes    7. You should understand that if you do not follow these instructions your procedure may be cancelled. If your physical condition changes (I.e. fever, cold or flu) please contact your doctor as soon as possible. 8. It is important that you be on time.   If for any reason you are unable to keep your appointment please call [phone] the day of or your physicians office prior to your scheduled procedure

## 2020-08-20 ENCOUNTER — HOSPITAL ENCOUNTER (OUTPATIENT)
Age: 30
Setting detail: OUTPATIENT SURGERY
Discharge: HOME | End: 2020-08-20
Attending: INTERNAL MEDICINE | Admitting: INTERNAL MEDICINE
Payer: MEDICAID

## 2020-08-20 ENCOUNTER — ANESTHESIA (OUTPATIENT)
Dept: ENDOSCOPY | Age: 30
End: 2020-08-20
Payer: MEDICAID

## 2020-08-20 ENCOUNTER — ANESTHESIA EVENT (OUTPATIENT)
Dept: ENDOSCOPY | Age: 30
End: 2020-08-20
Payer: MEDICAID

## 2020-08-20 VITALS
BODY MASS INDEX: 39.31 KG/M2 | RESPIRATION RATE: 15 BRPM | OXYGEN SATURATION: 98 % | DIASTOLIC BLOOD PRESSURE: 62 MMHG | SYSTOLIC BLOOD PRESSURE: 107 MMHG | WEIGHT: 250.44 LBS | TEMPERATURE: 97.7 F | HEIGHT: 67 IN | HEART RATE: 74 BPM

## 2020-08-20 PROCEDURE — 74011250636 HC RX REV CODE- 250/636: Performed by: INTERNAL MEDICINE

## 2020-08-20 PROCEDURE — 77030021593 HC FCPS BIOP ENDOSC BSC -A: Performed by: INTERNAL MEDICINE

## 2020-08-20 PROCEDURE — 76040000019: Performed by: INTERNAL MEDICINE

## 2020-08-20 PROCEDURE — 76060000031 HC ANESTHESIA FIRST 0.5 HR: Performed by: INTERNAL MEDICINE

## 2020-08-20 PROCEDURE — 74011250636 HC RX REV CODE- 250/636: Performed by: NURSE ANESTHETIST, CERTIFIED REGISTERED

## 2020-08-20 PROCEDURE — 74011000250 HC RX REV CODE- 250: Performed by: NURSE ANESTHETIST, CERTIFIED REGISTERED

## 2020-08-20 PROCEDURE — 88305 TISSUE EXAM BY PATHOLOGIST: CPT

## 2020-08-20 RX ORDER — SODIUM CHLORIDE 9 MG/ML
INJECTION, SOLUTION INTRAVENOUS
Status: DISCONTINUED | OUTPATIENT
Start: 2020-08-20 | End: 2020-08-20 | Stop reason: HOSPADM

## 2020-08-20 RX ORDER — ACETAMINOPHEN 325 MG/1
325 TABLET ORAL
COMMUNITY

## 2020-08-20 RX ORDER — PROPOFOL 10 MG/ML
INJECTION, EMULSION INTRAVENOUS
Status: DISCONTINUED | OUTPATIENT
Start: 2020-08-20 | End: 2020-08-20 | Stop reason: HOSPADM

## 2020-08-20 RX ORDER — DEXTROMETHORPHAN/PSEUDOEPHED 2.5-7.5/.8
1.2 DROPS ORAL
Status: DISCONTINUED | OUTPATIENT
Start: 2020-08-20 | End: 2020-08-20 | Stop reason: HOSPADM

## 2020-08-20 RX ORDER — UREA 10 %
LOTION (ML) TOPICAL
COMMUNITY

## 2020-08-20 RX ORDER — LIDOCAINE HYDROCHLORIDE 20 MG/ML
INJECTION, SOLUTION EPIDURAL; INFILTRATION; INTRACAUDAL; PERINEURAL AS NEEDED
Status: DISCONTINUED | OUTPATIENT
Start: 2020-08-20 | End: 2020-08-20 | Stop reason: HOSPADM

## 2020-08-20 RX ORDER — FLUMAZENIL 0.1 MG/ML
0.2 INJECTION INTRAVENOUS
Status: DISCONTINUED | OUTPATIENT
Start: 2020-08-20 | End: 2020-08-20 | Stop reason: HOSPADM

## 2020-08-20 RX ORDER — SODIUM CHLORIDE 9 MG/ML
50 INJECTION, SOLUTION INTRAVENOUS CONTINUOUS
Status: DISCONTINUED | OUTPATIENT
Start: 2020-08-20 | End: 2020-08-20 | Stop reason: HOSPADM

## 2020-08-20 RX ORDER — PROPOFOL 10 MG/ML
INJECTION, EMULSION INTRAVENOUS AS NEEDED
Status: DISCONTINUED | OUTPATIENT
Start: 2020-08-20 | End: 2020-08-20 | Stop reason: HOSPADM

## 2020-08-20 RX ORDER — NALOXONE HYDROCHLORIDE 0.4 MG/ML
0.4 INJECTION, SOLUTION INTRAMUSCULAR; INTRAVENOUS; SUBCUTANEOUS
Status: DISCONTINUED | OUTPATIENT
Start: 2020-08-20 | End: 2020-08-20 | Stop reason: HOSPADM

## 2020-08-20 RX ORDER — MIDAZOLAM HYDROCHLORIDE 1 MG/ML
.25-5 INJECTION, SOLUTION INTRAMUSCULAR; INTRAVENOUS
Status: DISCONTINUED | OUTPATIENT
Start: 2020-08-20 | End: 2020-08-20 | Stop reason: HOSPADM

## 2020-08-20 RX ORDER — EPINEPHRINE 0.1 MG/ML
1 INJECTION INTRACARDIAC; INTRAVENOUS
Status: DISCONTINUED | OUTPATIENT
Start: 2020-08-20 | End: 2020-08-20 | Stop reason: HOSPADM

## 2020-08-20 RX ORDER — ATROPINE SULFATE 0.1 MG/ML
0.4 INJECTION INTRAVENOUS
Status: DISCONTINUED | OUTPATIENT
Start: 2020-08-20 | End: 2020-08-20 | Stop reason: HOSPADM

## 2020-08-20 RX ADMIN — PROPOFOL 30 MG: 10 INJECTION, EMULSION INTRAVENOUS at 08:11

## 2020-08-20 RX ADMIN — LIDOCAINE HYDROCHLORIDE 40 MG: 20 INJECTION, SOLUTION INTRAVENOUS at 08:09

## 2020-08-20 RX ADMIN — MIDAZOLAM HYDROCHLORIDE 2 MG: 1 INJECTION, SOLUTION INTRAMUSCULAR; INTRAVENOUS at 08:11

## 2020-08-20 RX ADMIN — SODIUM CHLORIDE: 9 INJECTION, SOLUTION INTRAVENOUS at 07:00

## 2020-08-20 RX ADMIN — PROPOFOL 50 MG: 10 INJECTION, EMULSION INTRAVENOUS at 08:12

## 2020-08-20 RX ADMIN — PROPOFOL 160 MCG/KG/MIN: 10 INJECTION, EMULSION INTRAVENOUS at 08:10

## 2020-08-20 NOTE — ANESTHESIA POSTPROCEDURE EVALUATION
Procedure(s):  ESOPHAGOGASTRODUODENOSCOPY (EGD)  ESOPHAGOGASTRODUODENAL (EGD) BIOPSY. MAC    Anesthesia Post Evaluation      Multimodal analgesia: multimodal analgesia not used between 6 hours prior to anesthesia start to PACU discharge  Patient location during evaluation: PACU  Patient participation: complete - patient participated  Level of consciousness: awake  Pain management: adequate  Airway patency: patent  Anesthetic complications: no  Cardiovascular status: acceptable, blood pressure returned to baseline and hemodynamically stable  Respiratory status: acceptable  Hydration status: acceptable  Post anesthesia nausea and vomiting:  controlled      INITIAL Post-op Vital signs:   Vitals Value Taken Time   /70 8/20/2020  8:51 AM   Temp 36.5 °C (97.7 °F) 8/20/2020  8:32 AM   Pulse 69 8/20/2020  8:52 AM   Resp 15 8/20/2020  8:52 AM   SpO2 99 % 8/20/2020  8:52 AM   Vitals shown include unvalidated device data.

## 2020-08-20 NOTE — PROGRESS NOTES
Mechelle Worrellner  1990  [id number]    Situation:  Verbal report received from: Cassandra Nicole RN   Procedure: Procedure(s):  ESOPHAGOGASTRODUODENOSCOPY (EGD)  ESOPHAGOGASTRODUODENAL (EGD) BIOPSY    Background:    Preoperative diagnosis: GERD  Postoperative diagnosis: Normal EGD    :  Dr. Je Christopher   Assistant(s): Endoscopy Technician-1: Xiomara Day  Endoscopy RN-1: Klaudia Zapata    Specimens:   ID Type Source Tests Collected by Time Destination   1 : Biopsy Preservative Gastric  Madeline Aragon MD 8/20/2020 3705 Pathology     H. Pylori  no    Assessment:  Intra-procedure medications   Versed 2 mg    Anesthesia gave intra-procedure sedation and medications, see anesthesia flow sheet yes    Intravenous fluids: NS@ KVO     Vital signs stable   yes    Abdominal assessment: round and soft   yes    Recommendation:  Discharge patient per MD order  yes.   Return to floor  outpatient  Family or Friend   mom  Permission to share finding with family or friend yes

## 2020-08-20 NOTE — PROGRESS NOTES
Anesthesia staff at patient's bedside administering anesthesia and monitoring patients vital signs throughout procedure. See anesthesia note. ABD remains soft and non-tender post procedure. Pt has no complaints at this time and tolerated the procedure well. Endoscope was pre-cleaned at bedside immediately following procedure by [facility].

## 2020-08-20 NOTE — ANESTHESIA PREPROCEDURE EVALUATION
Relevant Problems   No relevant active problems       Anesthetic History   No history of anesthetic complications            Review of Systems / Medical History  Patient summary reviewed and pertinent labs reviewed    Pulmonary            Asthma : well controlled       Neuro/Psych         Psychiatric history     Cardiovascular  Within defined limits                Exercise tolerance: >4 METS     GI/Hepatic/Renal     GERD           Endo/Other        Obesity     Other Findings              Physical Exam    Airway  Mallampati: II  TM Distance: 4 - 6 cm  Neck ROM: normal range of motion   Mouth opening: Normal     Cardiovascular  Regular rate and rhythm,  S1 and S2 normal,  no murmur, click, rub, or gallop  Rhythm: regular  Rate: normal         Dental  No notable dental hx       Pulmonary  Breath sounds clear to auscultation               Abdominal  GI exam deferred       Other Findings            Anesthetic Plan    ASA: 3  Anesthesia type: MAC          Induction: Intravenous  Anesthetic plan and risks discussed with: Patient

## 2020-08-20 NOTE — DISCHARGE INSTRUCTIONS
Annette Hylton M.D.  [phone]           2020  Lis Roque  :  1990  [employer] Medical Record Number:  [id number]        ENDOSCOPY FINDINGS:   Your endoscopy showed normal exam.      EGD DISCHARGE INSTRUCTIONS    DISCOMFORT:  Sore throat- throat lozenges or warm salt water gargle  redness at IV site- apply warm compress to area; if redness or soreness persist- contact your physician  Gaseous discomfort- walking, belching will help relieve any discomfort  You may not operate a vehicle for 12 hours  You may not engage in an occupation involving machinery or appliances for rest of today  You may not drink alcoholic beverages for at least 12 hours  Avoid making any critical decisions for at least 24 hour    DIET:   You may resume your regular diet. ACTIVITY  Spend the remainder of the day resting -  avoid any strenuous activity. Avoid driving or operating machinery. CALL M.D. ANY SIGN OF   Increasing pain, nausea, vomiting  Abdominal distension (swelling)  New increased bleeding (oral or rectal)  Fever (chills)  Pain in chest area  Bloody discharge from nose or mouth  Shortness of breath    Follow-up Instructions:   Call Dr. Sudarshan Gonzalez for any questions or problems. Telephone # [phone]  If biopsies were obtained, the results will be available  in  5 to 7 days. We will call you to notify you of these results. Continue same medications. Follow up in the office.

## 2020-08-20 NOTE — PROGRESS NOTES
Endoscopy discharge instructions have been reviewed and given to patient. The patient verbalized understanding and acceptance of instructions.  discussed with patient procedure findings and next steps.

## 2020-08-20 NOTE — PROCEDURES
Gideon Smith M.D.  [phone]           2020                EGD Operative Report  Joanie Sears  :  1990  [facility] Magana Medical Record Number:  [id number]      Indication: nausea, gerd     : Nancy Marquez MD    Assistant -- None  Implants -- None    Referring Provider:  Raimundo Esquivel MD      Anesthesia/Sedation:  MAC anesthesia Propofol    Airway assessment: No airway problems anticipated    Pre-Procedural Exam:      Airway: clear, no airway problems anticipated  Heart: RRR, without gallops or rubs  Lungs: clear bilaterally without wheezes, crackles, or rhonchi  Abdomen: soft, nontender, nondistended, bowel sounds present  Mental Status: awake, alert and oriented to person, place and time       Procedure Details     After infomed consent was obtained for the procedure, with all risks and benefits of procedure explained the patient was taken to the endoscopy suite and placed in the left lateral decubitus position. Following sequential administration of sedation as per above, the endoscope was inserted into the mouth and advanced under direct vision to second portion of the duodenum. A careful inspection was made as the gastroscope was withdrawn, including a retroflexed view of the proximal stomach; findings and interventions are described below. Findings:   Esophagus:normal  Stomach: suture note at the GEJ suggestive of previous nissens fundoplicaton. The pylorus was patulous ? Previous pyloroplasty  Duodenum/jejunum: normal    Therapies:  biopsy of stomach - r/o h.pylori    Specimens: as above           Complications:   None; patient tolerated the procedure well. EBL:  None. Impression:    Normal EGD. Other findings as above      Recommendations:    -Continue acid suppression. ,   -Await pathology.     Nancy Marquez MD

## 2020-08-20 NOTE — H&P
Dianna Quinones M.D.  [phone]            History and Physical       NAME:  Bj Green   :   1990   MRN:   [id number]       Referring Physician:  Jarett Skaggs MD      Consult Date: 2020 8:03 AM    Chief Complaint:  GERD    History of Present Illness:  Mr Shobha Linda is a 31year old male complaining of nausea for the past year.  Worse after eating large meal.  Emesis only once which is not frequent for pt since having had a Nissen around age 11.  No fever noted.  No abdominal pain.  No blood in emesis.  No change in BM.  He denies weight loss but has been working toward a healthy diet.  No treatment tried. Quinn Childes self resolves after 5 minutes.   Denies NSAID use. Tobacco none  Alcohol none    Does not work    Denies family history of esophageal cancer    PMH:  Past Medical History:   Diagnosis Date    Adverse effect of anesthesia     stopped breathing during a surgery    Asthma     Asthma 2013    Gastrointestinal disorder     GERD (gastroesophageal reflux disease)     Hemoglobinopathy ([address] 2013    Obesity 2013    Psychiatric disorder     depression, anxiety        PSH:  Past Surgical History:   Procedure Laterality Date    ABDOMEN SURGERY PROC UNLISTED      nissen    HX GI      not sure    HX ORTHOPAEDIC      bilateral feet    HX TONSIL AND ADENOIDECTOMY      HX UROLOGICAL      stone  removal       Allergies:  No Known Allergies    Home Medications:  Prior to Admission Medications   Prescriptions Last Dose Informant Patient Reported? Taking?   acetaminophen (TylenoL) 325 mg tablet 2020  Yes Yes   Sig: Take 325 mg by mouth every four (4) hours as needed for Pain. albuterol (PROVENTIL HFA, VENTOLIN HFA, PROAIR HFA) 90 mcg/actuation inhaler Not Taking at Unknown time  Yes No   Sig: Take 1 Puff by inhalation every six (6) hours as needed for Wheezing.    doxepin (SINEquan) 10 mg capsule 2020 at Unknown time  Yes Yes Sig: Take 10 mg by mouth nightly. Indications: insomnia   emtricitabine-tenofovir, TDF, (Truvada) 200-300 mg per tablet 8/19/2020 at Unknown time  Yes Yes   Sig: Take 1 Tab by mouth daily. Indications: sexual health   melatonin 1 mg tablet 8/19/2020  Yes Yes   Sig: Take  by mouth. venlafaxine (EFFEXOR) 75 mg tablet 8/19/2020 at Unknown time  Yes Yes   Sig: Take 75 mg by mouth daily. Indications: major depressive disorder      Facility-Administered Medications: None       Hospital Medications:  No current facility-administered medications for this encounter. Social History:  Social History     Tobacco Use    Smoking status: Never Smoker    Smokeless tobacco: Never Used   Substance Use Topics    Alcohol use: Not Currently       Family History:  Family History   Problem Relation Age of Onset    Asthma Mother     Hypertension Mother     Diabetes Mother     Cancer Mother     Cancer Maternal Grandmother     Cancer Maternal Grandfather              Review of Systems:      Constitutional: negative fever, negative chills, negative weight loss  Eyes:   negative visual changes  ENT:   negative sore throat, tongue or lip swelling  Respiratory:  negative cough, negative dyspnea  Cards:  negative for chest pain, palpitations, lower extremity edema  GI:   See HPI  :  negative for frequency, dysuria  Integument:  negative for rash and pruritus  Heme:  negative for easy bruising and gum/nose bleeding  Musculoskel: negative for myalgias,  back pain and muscle weakness  Neuro: negative for headaches, dizziness, vertigo  Psych:  negative for feelings of anxiety, depression       Objective:     Patient Vitals for the past 8 hrs:   BP Temp Pulse Resp SpO2 Height Weight   08/20/20 0651 123/82 99.2 °F (37.3 °C) 69 18 100 % 5' 7\" (1.702 m) 113.6 kg (250 lb 7.1 oz)     No intake/output data recorded. No intake/output data recorded.     EXAM:     NEURO-a&o   HEENT-wnl   LUNGS-clear    COR-regular rate and rhythym ABD-soft , no tenderness, no rebound, good bs     EXT-no edema     Data Review     No results for input(s): WBC, HGB, HCT, PLT, HGBEXT, HCTEXT, PLTEXT in the last 72 hours. No results for input(s): NA, K, CL, CO2, BUN, CREA, GLU, PHOS, CA in the last 72 hours. No results for input(s): AP, TBIL, TP, ALB, GLOB, GGT, AML, LPSE in the last 72 hours. No lab exists for component: SGOT, GPT, AMYP, HLPSE  No results for input(s): INR, PTP, APTT, INREXT in the last 72 hours. Patient Active Problem List   Diagnosis Code    Adjustment disorder with mixed anxiety and depressed mood F43.23    Asthma J45.909    GERD (gastroesophageal reflux disease) K21.9    Psychiatric disorder F99    Obesity E66.9    Hemoglobinopathy ([facility] D58.2    Obesity, morbid ([facility] E66.01      Assessment:   · GERD   Plan:   · EGD today.      Signed By: Clifford Conway MD     8/20/2020  8:03 AM

## 2022-02-21 ENCOUNTER — OFFICE VISIT (OUTPATIENT)
Dept: SURGERY | Age: 32
End: 2022-02-21
Payer: MEDICAID

## 2022-02-21 VITALS
WEIGHT: 295.5 LBS | DIASTOLIC BLOOD PRESSURE: 92 MMHG | TEMPERATURE: 97.3 F | HEART RATE: 94 BPM | HEIGHT: 67 IN | BODY MASS INDEX: 46.38 KG/M2 | OXYGEN SATURATION: 100 % | SYSTOLIC BLOOD PRESSURE: 133 MMHG

## 2022-02-21 DIAGNOSIS — I10 HYPERTENSION, UNSPECIFIED TYPE: ICD-10-CM

## 2022-02-21 DIAGNOSIS — J45.909 ASTHMA, UNSPECIFIED ASTHMA SEVERITY, UNSPECIFIED WHETHER COMPLICATED, UNSPECIFIED WHETHER PERSISTENT: ICD-10-CM

## 2022-02-21 DIAGNOSIS — G47.33 OBSTRUCTIVE SLEEP APNEA SYNDROME: ICD-10-CM

## 2022-02-21 DIAGNOSIS — E66.01 OBESITY, MORBID (HCC): Primary | ICD-10-CM

## 2022-02-21 DIAGNOSIS — E66.01 MORBID OBESITY WITH BMI OF 45.0-49.9, ADULT (HCC): ICD-10-CM

## 2022-02-21 DIAGNOSIS — K21.9 GASTROESOPHAGEAL REFLUX DISEASE, UNSPECIFIED WHETHER ESOPHAGITIS PRESENT: ICD-10-CM

## 2022-02-21 PROCEDURE — 99205 OFFICE O/P NEW HI 60 MIN: CPT | Performed by: SPECIALIST

## 2022-02-21 NOTE — PROGRESS NOTES
Bariatric Surgery Consultation    Subjective: The patient is a 27 y.o. obese male with a Body mass index is 46.28 kg/m². .  The patient is at his heaviest weight for the past several years. he has been overweight since childhood. he has been considering surgery since past .  he desires surgery at this time because of multiple health concerns and their lifestyle issues which are hindered by their weight. he has been referred by his family physician Dr Abel Coats for evaluation and treatment of their obesity via surgical intervention. Emmy Nguyen has tried multiple diets in his lifetime most recently tried behavior modification and unsupervised diets     The patient's surgical history dates back to age 14 when for an unclear reason he underwent an exploratory laparotomy with pyloroplasty. He states that he apparently had a second operation through the same incision for an identical problem. He is totally unsure as to the diagnosis that led to that surgical procedure. At age 11 at [facility], by pediatric surgeon, he underwent a laparoscopic Nissen fundoplication for the diagnosis of gastroesophageal reflux disease. Since that time. He has had a yoyo effect with his weight being able to lose down to 215 pounds by himself then over the past year bouncing back up to almost 300 pounds. He presented to Dr. Valentin Chung in [facility] at [facility] for consultation for weight loss surgery. He underwent a medical weight loss plan and Dr. Riri Amaro left acutely from that hospital system. Dr. Robert Rojas was to proceed with takedown of his Nissen fundoplication and conversion to the sleeve gastrectomy. I have reviewed his chart in the Care Everywhere carlos and have not been able to find any particular work-up regarding this other than an endoscopy which suggested his Nissen fundoplication was intact.   He now presents to me in consultation for his situation requesting the sleeve gastrectomy. 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 A    Hypertension I10    Kidney stones N20.0    Eczema L30.9    Sleep apnea G47.30    Morbid obesity with BMI of 45.0-49.9, adult ([facility]) E66.01, Z68.42    Chronic pain of both feet M79.671, G89.29, F38.129       The patient is considering laparoscopic sleeve gastrectomy for surgical weight loss due to their ineffective progress with medical forms of weight loss and the urging of their physician who cares for their primary medical issues. The patient  now presents  for consideration for weight loss surgery understanding the benefits of this over a medical approach of weight loss as was discussed in our presentation on weight loss surgery. They have discussed their plans both with their family and primary care physician who is in support of their pursuit of such. The patient has not had health issues as of late and denies and gastrointestinal disturbances other than what is outlined below in their review of symptoms. All of their prior evaluations available by both their PCP's and specialists physicians have been reviewed today either in the Care Everywhere portal or scanned under the media tab. I have spent a large portion of my initial consultation today reviewing the patients current dietary habits which have contributed to their health issues and obesity. I have suggested to them personally a dietary regimen that they can initiate now to help with their status as it pertains to their weight.   They understand that the most important aspect of their journey through their weight loss endeavor will be their adherence to a new lifestyle of healthy eating behavior. They also understand that an adherence to an exercise program will not only help with weight loss but is ultimately important in weight maintenance. The patients goal weight is 200 lb. These goals are consistent with expected outcomes of their desired operation. his Medical goals are resolution of these health issues. Patient Active Problem List    Diagnosis Date Noted    Kidney stones     Eczema     Sleep apnea     Morbid obesity with BMI of 45.0-49.9, adult (HCC)     Chronic pain of both feet     Depression     Hypertension     Obesity, morbid ([facility] 06/19/2018    Hemoglobinopathy ([facility] 08/20/2013    Asthma 07/22/2013    Obesity 07/22/2013    GERD (gastroesophageal reflux disease)     Psychiatric disorder     Adjustment disorder with mixed anxiety and depressed mood 02/24/2013     Past Surgical History:   Procedure Laterality Date    HX CYST INCISION AND DRAINAGE      finger    HX GI  2006    lap nissen    HX GI      pyloroplasty x 2 at age 14   [de-identified] ORTHOPAEDIC      bilateral feet with hardware    HX TONSIL AND ADENOIDECTOMY        Social History     Tobacco Use    Smoking status: Never Smoker    Smokeless tobacco: Never Used   Substance Use Topics    Alcohol use: Not Currently      Family History   Problem Relation Age of Onset    Asthma Mother     Hypertension Mother     Diabetes Mother     Cancer Mother     Cancer Maternal Grandmother     Cancer Maternal Grandfather       Current Outpatient Medications   Medication Sig Dispense Refill    melatonin 1 mg tablet Take  by mouth.  acetaminophen (TylenoL) 325 mg tablet Take 325 mg by mouth every four (4) hours as needed for Pain.  doxepin (SINEquan) 10 mg capsule Take 10 mg by mouth nightly. Indications: insomnia      venlafaxine (EFFEXOR) 75 mg tablet Take 75 mg by mouth daily.  Indications: major depressive disorder      albuterol (PROVENTIL HFA, VENTOLIN HFA, PROAIR HFA) 90 mcg/actuation inhaler Take 1 Puff by inhalation every six (6) hours as needed for Wheezing.        No Known Allergies       Review of Systems:       General - No history or complaints of unexpected fever, chills, or weight loss  Head/Neck - No history or complaints of headache, diplopia, dysphagia, hearing loss  Cardiac - No history or complaints of chest pain, palpitations, murmur, or shortness of breath  Pulmonary - No history or complaints of shortness of breath, productive cough, hemoptysis  Gastrointestinal - no reflux since Nissen repair,no  abdominal pain, obstipation/constipation or blood per rectum  Genitourinary - No history or complaints of hematuria/dysuria, stress urinary incontinence symptoms, or renal lithiasis  Musculoskeletal - has joint pain in their shoulder and feet,  no muscular weakness  Hematologic - No history or complaints of bleeding disorders,  No blood transfusions  Neurologic - No history or complaints of  migraine headaches, seizure activity, syncopal episodes, TIA or stroke  Integumentary - No history or complaints of rashes, abnormal nevi, skin cancer                 Objective:     Visit Vitals  BP (!) 133/92 (BP 1 Location: Left upper arm, BP Patient Position: Sitting, BP Cuff Size: Large adult)   Pulse 94   Temp 97.3 °F (36.3 °C)   Ht 5' 7\" (1.702 m)   Wt 134 kg (295 lb 8 oz)   SpO2 100%   BMI 46.28 kg/m²       Physical Examination: General appearance - alert, well appearing, and in no distress and oriented to person, place, and time  Mental status - alert, oriented to person, place, and time, normal mood, behavior, speech, dress, motor activity, and thought processes  Eyes - pupils equal and reactive, extraocular eye movements intact, sclera anicteric, left eye normal, right eye normal  Ears - right ear normal, left ear normal  Nose - normal and patent, no erythema, discharge or polyps  Mouth - mucous membranes moist, pharynx normal without lesions  Neck - supple, no significant adenopathy  Lymphatics - no palpable lymphadenopathy, no hepatosplenomegaly  Chest - clear to auscultation, no wheezes, rales or rhonchi, symmetric air entry  Heart - normal rate, regular rhythm, normal S1, S2, no murmurs, rubs, clicks or gallops  Abdomen - soft, nontender, nondistended, no masses or organomegaly  Back exam - full range of motion, no tenderness, palpable spasm or pain on motion  Neurological - alert, oriented, normal speech, no focal findings or movement disorder noted  Musculoskeletal - no joint tenderness, deformity or swelling  Extremities - peripheral pulses normal, no pedal edema, no clubbing or cyanosis  Skin - normal coloration and turgor, no rashes, no suspicious skin lesions noted    Labs:       No results found for this or any previous visit (from the past 1440 hour(s)). Assessment:     Morbid obesity with comorbidity    Plan: At this juncture of spent well over an hour reviewing his history, his charts in the care everywhere carlos and also performing a physical examination on him. I have had a very extensive complex discussion with him regarding the Nissen fundoplication and the conversion to a sleeve gastrectomy. As I explained to him that concept of takedown of his Nissen wrap to perform a sleeve gastrectomy makes no clear medical sense. The sleeve gastrectomy is not considered to be an antireflux surgery and taking the wrap down and performing a sleeve gastrectomy might predispose him to lifelong refluxing. I have also explained to him that his history is quite unusual pertaining to his pyloroplasty and the reason behind that surgical procedure is unclear. At a minimum I would think that the patient would need to undergo a gastric emptying study to make sure he has normal gastric emptying prior to proceeding with any surgical procedure.   Lastly I explained to the patient that in my practice I have not typically offered Nissen patients any other surgical procedure due to the potential risk of a conversion with the gastric bypass. I have explained to him that there is a paucity of data available regarding the safety of such a procedure and most studies contained only a limited number of patients with high complication rates. I have explained to him that I have heard that Dr. Pat Butler is going to be performing surgical procedures at the [facility] in [place] and I have given him the name and address of the hospital if he would like to look into this. We will not be offering him any surgical procedure at this juncture and will leave it up to either he and Dr. Pat Butler to make that decision or he can seek a third opinion at [place] [address] with Dr. Lucia Gregory.     Secondary Diagnoses:         Signed By: Lon Major MD     February 21, 2022

## 2022-02-21 NOTE — PATIENT INSTRUCTIONS

## 2023-06-14 NOTE — PROGRESS NOTES
CC: Hypertension      HPI:    He is a 33 y.o. male who presents for follow up on HTN    Recall last visit we started patient on medication   losartan-hydroCHLOROthiazide (HYZAAR) 50-12.5 mg  His blood pressure is doing well 121/86  Medication started 06/19  Denies CP and shortness of breath    For the past 4 days notes headaches frontal and back and sweats \" feels hot all the time\"   No fever and chills  Headache are not intense takes tylenol which relieves headache/ no focal symptoms  Wonders if thyroid issue  Denies issue with constipation       GERD: patient is s/p nissen procedure in 2008 - denied active GERD symptoms        ROS:  10 systems reviewed and negative other HPI     Past Medical History:   Diagnosis Date    Asthma     Asthma 7/22/2013    Gastrointestinal disorder     GERD (gastroesophageal reflux disease)     Hemoglobinopathy ([facility] 8/20/2013    Obesity 7/22/2013    Psychiatric disorder     depression, anxiety        Current Outpatient Prescriptions on File Prior to Visit   Medication Sig Dispense Refill    losartan-hydroCHLOROthiazide (HYZAAR) 50-12.5 mg per tablet Take 1 Tab by mouth daily. 30 Tab 1    albuterol (PROVENTIL HFA, VENTOLIN HFA) 90 mcg/actuation inhaler Take 2 Puffs by inhalation every four (4) hours as needed for Wheezing. 1 Inhaler 5    fluticasone (FLONASE) 50 mcg/actuation nasal spray 2 Sprays by Both Nostrils route daily. 1 Bottle 5     No current facility-administered medications on file prior to visit.         Past Surgical History:   Procedure Laterality Date    ABDOMEN SURGERY PROC UNLISTED  2005    nissen    HX GI      gerd    HX ORTHOPAEDIC      bilateral feet    HX TONSIL AND ADENOIDECTOMY      HX UROLOGICAL      stone  removal       Family History   Problem Relation Age of Onset    Asthma Mother     Hypertension Mother     Diabetes Mother      Reviewed and no changes     Social History     Social History    Marital status: SINGLE     Spouse name: N/A   [address] [place] [address] Number of children: N/A    Years of education: N/A     Occupational History    Not on file.      Social History Main Topics    Smoking status: Never Smoker    Smokeless tobacco: Never Used    Alcohol use Yes    Drug use: No    Sexual activity: Not Currently     Other Topics Concern    Not on file     Social History Narrative    Lives with mom and nephew            Visit Vitals    /86 (BP 1 Location: Right arm, BP Patient Position: Sitting)    Pulse 94    Temp 97.8 °F (36.6 °C) (Oral)    Resp 18    Ht 5' 7\" (1.702 m)    Wt 283 lb (128.4 kg)    SpO2 99%    BMI 44.32 kg/m2       Physical Examination:   General - Well appearing male  HEENT - PERRL, TM no erythema/opacification, normal nasal turbinates, oropharynx no erythema or exudate, MMM  Neck - supple, no bruits, no TMG, no LAD  Pulm - clear to auscultation bilaterally  Cardio - RRR, normal S1 S2, no murmur gallops or rubs  Abd - soft, nontender, no masses, no HSM  Extrem - no edema, +2 distal pulses  Psych - normal affect, appropriate mood    Lab Results   Component Value Date/Time    WBC 6.5 06/19/2018 10:22 AM    Hemoglobin (POC) 16.7 01/08/2014 11:41 AM    HGB 14.8 06/19/2018 10:22 AM    Hematocrit (POC) 49 01/08/2014 11:41 AM    HCT 46.1 06/19/2018 10:22 AM    PLATELET 324 48/29/6871 10:22 AM    MCV 70 (L) 06/19/2018 10:22 AM     Lab Results   Component Value Date/Time    Sodium 142 06/19/2018 10:22 AM    Potassium 4.1 06/19/2018 10:22 AM    Chloride 102 06/19/2018 10:22 AM    CO2 22 06/19/2018 10:22 AM    Anion gap 9 02/23/2013 06:55 AM    Glucose 88 06/19/2018 10:22 AM    Glucose 95 02/24/2013 06:45 AM    BUN 7 06/19/2018 10:22 AM    Creatinine 1.20 06/19/2018 10:22 AM    BUN/Creatinine ratio 6 (L) 06/19/2018 10:22 AM    GFR est AA 95 06/19/2018 10:22 AM    GFR est non-AA 82 06/19/2018 10:22 AM    Calcium 9.1 06/19/2018 10:22 AM     Lab Results   Component Value Date/Time    Cholesterol, total 184 06/19/2018 10:22 AM    HDL Cholesterol 44 06/19/2018 10:22 AM    LDL, calculated 125 (H) 06/19/2018 10:22 AM    VLDL, calculated 15 06/19/2018 10:22 AM    Triglyceride 77 06/19/2018 10:22 AM    CHOL/HDL Ratio 3.3 02/24/2013 06:45 AM     Lab Results   Component Value Date/Time    TSH 0.87 02/24/2013 06:45 AM     No results found for: PSA, Ramu Smoke, [id number], [id number], PSALT  Lab Results   Component Value Date/Time    Hemoglobin A1c 5.6 06/19/2018 10:22 AM     No results found for: Shae Littler, [id number]    Lab Results   Component Value Date/Time    ALT (SGPT) 21 06/19/2018 10:22 AM    AST (SGOT) 23 06/19/2018 10:22 AM    Alk. phosphatase 79 06/19/2018 10:22 AM    Bilirubin, direct <0.1 01/08/2014 11:35 AM    Bilirubin, total 0.3 06/19/2018 10:22 AM           Assessment/Plan:    1. Essential hypertension  Blood pressure is well controlled with losartan HCTZ ( 50/12. 5) mg  -continue current therapy. If headaches persist then we can consider a different medication ( headaches are new)   recommend checking blood pressure with goal of less than  130/85 on average. Follow lo sodium diet. Given DASH diet guidelines. Recommend cardiovascular exercise regularly. Work on weight reduction. Call with blood pressure readings.   - METABOLIC PANEL, BASIC    2. Chronic fatigue and constipation  - TSH AND FREE T4      Follow-up Disposition:  Return in about 3 months (around 10/17/2018) for HTN.     Enid White MD Nasal Turnover Hinge Flap Text: The defect edges were debeveled with a #15 scalpel blade.  Given the size, depth, location of the defect and the defect being full thickness a nasal turnover hinge flap was deemed most appropriate.  Using a sterile surgical marker, an appropriate hinge flap was drawn incorporating the defect. The area thus outlined was incised with a #15 scalpel blade. The flap was designed to recreate the nasal mucosal lining and the alar rim. The skin margins were undermined to an appropriate distance in all directions utilizing iris scissors.

## (undated) DEVICE — SET GRAV CK VLV NEEDLESS ST 3 GANGED 4WAY STPCOCK HI FLO 10

## (undated) DEVICE — 1200 GUARD II KIT W/5MM TUBE W/O VAC TUBE: Brand: GUARDIAN

## (undated) DEVICE — BAG SPEC BIOHZRD 10 X 10 IN --

## (undated) DEVICE — ELECTRODE,RADIOTRANSLUCENT,FOAM,3PK: Brand: MEDLINE

## (undated) DEVICE — BAG BELONG PT PERS CLEAR HANDL

## (undated) DEVICE — FORCEPS BX L240CM JAW DIA2.8MM L CAP W/ NDL MIC MESH TOOTH

## (undated) DEVICE — BITEBLOCK ENDOSCP 60FR MAXI WHT POLYETH STURDY W/ VELC WVN

## (undated) DEVICE — CANNULA CUSH AD W/ 14FT TBG

## (undated) DEVICE — Device

## (undated) DEVICE — CATH IV AUTOGRD BC PNK 20GA 25 -- INSYTE

## (undated) DEVICE — CONTAINER SPEC 20 ML LID NEUT BUFF FORMALIN 10 % POLYPR STS

## (undated) DEVICE — SOLIDIFIER MEDC 1200ML -- CONVERT TO 356117